# Patient Record
Sex: FEMALE | Race: WHITE | NOT HISPANIC OR LATINO | Employment: FULL TIME | URBAN - METROPOLITAN AREA
[De-identification: names, ages, dates, MRNs, and addresses within clinical notes are randomized per-mention and may not be internally consistent; named-entity substitution may affect disease eponyms.]

---

## 2023-03-29 ENCOUNTER — HOSPITAL ENCOUNTER (EMERGENCY)
Facility: HOSPITAL | Age: 53
Discharge: HOME | End: 2023-03-30
Attending: EMERGENCY MEDICINE

## 2023-03-29 DIAGNOSIS — R93.5 ABNORMAL CT OF THE ABDOMEN: ICD-10-CM

## 2023-03-29 DIAGNOSIS — I10 HYPERTENSION: ICD-10-CM

## 2023-03-29 DIAGNOSIS — D35.00 PHEOCHROMOCYTOMA: ICD-10-CM

## 2023-03-29 DIAGNOSIS — R51.9 HEADACHE: ICD-10-CM

## 2023-03-29 DIAGNOSIS — R91.1 PULMONARY NODULE: Primary | ICD-10-CM

## 2023-03-29 LAB
ALBUMIN SERPL BCP-MCNC: 4.1 G/DL (ref 3.5–5)
ALP SERPL-CCNC: 106 U/L (ref 34–104)
ALT SERPL W P-5'-P-CCNC: 17 U/L (ref 7–52)
ANION GAP SERPL CALCULATED.3IONS-SCNC: 7 MMOL/L (ref 4–13)
AST SERPL W P-5'-P-CCNC: 17 U/L (ref 13–39)
BASOPHILS # BLD MANUAL: 0.12 THOUSAND/UL (ref 0–0.1)
BASOPHILS NFR MAR MANUAL: 1 % (ref 0–1)
BILIRUB SERPL-MCNC: 0.31 MG/DL (ref 0.2–1)
BUN SERPL-MCNC: 26 MG/DL (ref 5–25)
CALCIUM SERPL-MCNC: 9.4 MG/DL (ref 8.4–10.2)
CARDIAC TROPONIN I PNL SERPL HS: 5 NG/L
CHLORIDE SERPL-SCNC: 107 MMOL/L (ref 96–108)
CO2 SERPL-SCNC: 26 MMOL/L (ref 21–32)
CREAT SERPL-MCNC: 1.23 MG/DL (ref 0.6–1.3)
EOSINOPHIL # BLD MANUAL: 0.23 THOUSAND/UL (ref 0–0.4)
EOSINOPHIL NFR BLD MANUAL: 2 % (ref 0–6)
ERYTHROCYTE [DISTWIDTH] IN BLOOD BY AUTOMATED COUNT: 16 % (ref 11.6–15.1)
GFR SERPL CREATININE-BSD FRML MDRD: 49 ML/MIN/1.73SQ M
GLUCOSE SERPL-MCNC: 105 MG/DL (ref 65–140)
HCT VFR BLD AUTO: 33.3 % (ref 34.8–46.1)
HGB BLD-MCNC: 10.2 G/DL (ref 11.5–15.4)
LYMPHOCYTES # BLD AUTO: 46 % (ref 14–44)
LYMPHOCYTES # BLD AUTO: 5.29 THOUSAND/UL (ref 0.6–4.47)
MCH RBC QN AUTO: 26.2 PG (ref 26.8–34.3)
MCHC RBC AUTO-ENTMCNC: 30.6 G/DL (ref 31.4–37.4)
MCV RBC AUTO: 85 FL (ref 82–98)
MONOCYTES # BLD AUTO: 1.04 THOUSAND/UL (ref 0–1.22)
MONOCYTES NFR BLD: 9 % (ref 4–12)
NEUTROPHILS # BLD MANUAL: 4.83 THOUSAND/UL (ref 1.85–7.62)
NEUTS SEG NFR BLD AUTO: 42 % (ref 43–75)
PLATELET # BLD AUTO: 429 THOUSANDS/UL (ref 149–390)
PLATELET BLD QL SMEAR: ABNORMAL
PMV BLD AUTO: 9 FL (ref 8.9–12.7)
POTASSIUM SERPL-SCNC: 4 MMOL/L (ref 3.5–5.3)
PROT SERPL-MCNC: 7.2 G/DL (ref 6.4–8.4)
RBC # BLD AUTO: 3.9 MILLION/UL (ref 3.81–5.12)
RBC MORPH BLD: NORMAL
SODIUM SERPL-SCNC: 140 MMOL/L (ref 135–147)
WBC # BLD AUTO: 11.5 THOUSAND/UL (ref 4.31–10.16)

## 2023-03-29 RX ORDER — GABAPENTIN 300 MG/1
600 CAPSULE ORAL ONCE
Status: COMPLETED | OUTPATIENT
Start: 2023-03-29 | End: 2023-03-30

## 2023-03-29 RX ORDER — RISPERIDONE 1 MG/1
1 TABLET ORAL ONCE
Status: COMPLETED | OUTPATIENT
Start: 2023-03-29 | End: 2023-03-30

## 2023-03-29 RX ORDER — CLONAZEPAM 0.5 MG/1
1 TABLET ORAL ONCE
Status: COMPLETED | OUTPATIENT
Start: 2023-03-29 | End: 2023-03-30

## 2023-03-29 RX ORDER — HYDROXYZINE HYDROCHLORIDE 25 MG/1
50 TABLET, FILM COATED ORAL ONCE
Status: COMPLETED | OUTPATIENT
Start: 2023-03-29 | End: 2023-03-30

## 2023-03-29 RX ORDER — ACETAMINOPHEN 325 MG/1
650 TABLET ORAL ONCE
Status: COMPLETED | OUTPATIENT
Start: 2023-03-30 | End: 2023-03-30

## 2023-03-30 ENCOUNTER — APPOINTMENT (EMERGENCY)
Dept: CT IMAGING | Facility: HOSPITAL | Age: 53
End: 2023-03-30

## 2023-03-30 VITALS
WEIGHT: 125 LBS | HEIGHT: 67 IN | DIASTOLIC BLOOD PRESSURE: 82 MMHG | BODY MASS INDEX: 19.62 KG/M2 | RESPIRATION RATE: 16 BRPM | OXYGEN SATURATION: 99 % | HEART RATE: 92 BPM | TEMPERATURE: 98 F | SYSTOLIC BLOOD PRESSURE: 148 MMHG

## 2023-03-30 LAB
2HR DELTA HS TROPONIN: 0 NG/L
ATRIAL RATE: 105 BPM
CARDIAC TROPONIN I PNL SERPL HS: 5 NG/L
P AXIS: 79 DEGREES
PR INTERVAL: 124 MS
QRS AXIS: 70 DEGREES
QRSD INTERVAL: 86 MS
QT INTERVAL: 334 MS
QTC INTERVAL: 441 MS
T WAVE AXIS: 84 DEGREES
VENTRICULAR RATE: 105 BPM

## 2023-03-30 RX ADMIN — HYDROXYZINE HYDROCHLORIDE 50 MG: 25 TABLET, FILM COATED ORAL at 00:26

## 2023-03-30 RX ADMIN — CLONAZEPAM 1 MG: 0.5 TABLET ORAL at 00:26

## 2023-03-30 RX ADMIN — IOHEXOL 100 ML: 350 INJECTION, SOLUTION INTRAVENOUS at 00:31

## 2023-03-30 RX ADMIN — GABAPENTIN 600 MG: 300 CAPSULE ORAL at 00:26

## 2023-03-30 RX ADMIN — SODIUM CHLORIDE 1000 ML: 0.9 INJECTION, SOLUTION INTRAVENOUS at 00:26

## 2023-03-30 RX ADMIN — RISPERIDONE 1 MG: 1 TABLET ORAL at 00:31

## 2023-03-30 RX ADMIN — ACETAMINOPHEN 650 MG: 325 TABLET, FILM COATED ORAL at 00:26

## 2023-03-30 NOTE — ED TRIAGE NOTES
Pt  Arrives to ED from [facility] with complaints of high blood pressure  Pt  Reports [facility] took her BP and is was 180/90 and 166/90  Pt  Reports headaches the past couple of days  Pt  Reports she was sent to ED to be checked since she has a hx of adrenal gland tumor  Denies CP or SOB   Pt  Very anxious in triage

## 2023-03-30 NOTE — ED PROVIDER NOTES
"History  Chief Complaint   Patient presents with   • Hypertension     45 yo F sent to ED from [facility] for evaluation of HTN  PMH of asthma, bipolar, CKD2, HLD, PUD, pheochromocytoma 2021  She is at [facility] for mental health, and relapse on methamphetamines  Last used 2 wks ago, when she entered [facility]  No ETOH  Denies cp/sob  No n/v  No change in bladder/bowel habits  No abd pain  No focal numbness/tingling/weakness  She used to have migraines when she was younger, but hasn't really been prone to HA until her pheochromocytoma was found in 2021  Removed at [facility]  She has routine imaging yearly to monitor, last scan in Oct  Pt hasn't been \"feeling great\" since being at [facility], had covid in the beginning of the month  Only new med is risperdal prn  No fevers/chills  BP was checked at [facility] because they couldn't get the pulse ox to work (checked routinely due to h/o asthma) so they were checking manual vitals, and incidentally found the BP to be elevated 180s SBP  Doesn't take anything for BP usually         History provided by:  Patient and medical records   used: No    Hypertension  Severity:  Moderate  Onset quality:  Unable to specify  Timing:  Unable to specify  Progression:  Improving  Chronicity:  Recurrent  Context: drug abuse, medication change and stress    Context: normal sodium, not caffeine, not herbal remedies, not noncompliance and not OTC medications used    Relieved by:  None tried  Worsened by:  Nothing  Ineffective treatments:  None tried  Associated symptoms: headaches    Associated symptoms: no abdominal pain, no chest pain, no confusion, no dizziness, no ear pain, no fatigue, no fever, no hematuria, no nausea, no neck pain, no palpitations, no shortness of breath, not vomiting and no weakness    Risk factors: kidney disease    Risk factors: no alcohol use, no cardiac disease, no cocaine use, no decongestant use, no diabetes, no family " history of hypertension, no obesity, no prior aneurysm, no prior stroke, no PVD and no tobacco use        None       Past Medical History:   Diagnosis Date   • Asthma    • Bipolar 1 disorder ([facility] [address] )    • Hyperlipemia    • Stomach ulcer        Past Surgical History:   Procedure Laterality Date   • ADRENAL GLAND SURGERY     • CHOLECYSTECTOMY     • HYSTERECTOMY         History reviewed  No pertinent family history  I have reviewed and agree with the history as documented  E-Cigarette/Vaping     E-Cigarette/Vaping Substances     Social History     Substance Use Topics   • Drug use: Not Currently     Types: Methamphetamines       Review of Systems   Constitutional: Negative for appetite change, chills, fatigue and fever  HENT: Negative for congestion, ear pain, rhinorrhea, sore throat, trouble swallowing and voice change  Eyes: Negative for photophobia, pain, redness and visual disturbance  Respiratory: Negative for cough, chest tightness and shortness of breath  Cardiovascular: Negative for chest pain, palpitations and leg swelling  Gastrointestinal: Negative for abdominal pain, blood in stool, constipation, diarrhea, nausea and vomiting  Genitourinary: Negative for difficulty urinating, dysuria, flank pain, frequency and hematuria  Musculoskeletal: Negative for back pain, neck pain and neck stiffness  Skin: Negative for rash  Neurological: Positive for headaches  Negative for dizziness, tremors, seizures, syncope, facial asymmetry, speech difficulty, weakness, light-headedness and numbness  Psychiatric/Behavioral: Negative for confusion and suicidal ideas  Physical Exam  Physical Exam  Vitals and nursing note reviewed  Constitutional:       General: She is not in acute distress  Appearance: Normal appearance  She is well-developed  She is not ill-appearing, toxic-appearing or diaphoretic  HENT:      Head: Normocephalic and atraumatic        Right Ear: External ear normal       Left Ear: External ear normal       Nose: Nose normal       Mouth/Throat:      Mouth: Mucous membranes are dry  Eyes:      General: No scleral icterus  Right eye: No discharge  Left eye: No discharge  Extraocular Movements: Extraocular movements intact  Conjunctiva/sclera: Conjunctivae normal       Pupils: Pupils are equal, round, and reactive to light  Neck:      Trachea: No tracheal deviation  Cardiovascular:      Rate and Rhythm: Normal rate and regular rhythm  Pulses: Normal pulses  Heart sounds: Normal heart sounds  No murmur heard  No friction rub  No gallop  Pulmonary:      Effort: Pulmonary effort is normal  No respiratory distress  Breath sounds: Normal breath sounds  No stridor  Chest:      Chest wall: No tenderness  Abdominal:      General: Bowel sounds are normal       Palpations: Abdomen is soft  Tenderness: There is no abdominal tenderness  There is no guarding or rebound  Musculoskeletal:         General: No deformity  Normal range of motion  Cervical back: Normal range of motion and neck supple  No rigidity or tenderness  Right lower leg: No edema  Left lower leg: No edema  Lymphadenopathy:      Cervical: No cervical adenopathy  Skin:     General: Skin is warm and dry  Coloration: Skin is not jaundiced  Findings: No bruising or rash  Neurological:      General: No focal deficit present  Mental Status: She is alert and oriented to person, place, and time  Cranial Nerves: No cranial nerve deficit  Sensory: No sensory deficit  Motor: No weakness        Coordination: Coordination normal    Psychiatric:         Behavior: Behavior normal          Vital Signs  ED Triage Vitals   Temperature Pulse Respirations Blood Pressure SpO2   03/29/23 2115 03/29/23 2115 03/29/23 2115 03/29/23 2115 03/29/23 2115   98 °F (36 7 °C) 104 16 (!) 181/83 100 %      Temp Source Heart Rate Source Patient Position - Orthostatic VS BP Location FiO2 (%)   03/29/23 2115 03/29/23 2332 03/29/23 2332 03/29/23 2332 --   Temporal Monitor Sitting Left arm       Pain Score       03/29/23 2332       5           Vitals:    03/29/23 2115 03/29/23 2332 03/30/23 0217 03/30/23 0330   BP: (!) 181/83 150/83 151/76 148/82   Pulse: 104 91 85 92   Patient Position - Orthostatic VS:  Sitting Lying Lying         Visual Acuity      ED Medications  Medications   gabapentin (NEURONTIN) capsule 600 mg (600 mg Oral Given 3/30/23 0026)   clonazePAM (KlonoPIN) tablet 1 mg (1 mg Oral Given 3/30/23 0026)   risperiDONE (RisperDAL) tablet 1 mg (1 mg Oral Given 3/30/23 0031)   hydrOXYzine HCL (ATARAX) tablet 50 mg (50 mg Oral Given 3/30/23 0026)   sodium chloride 0 9 % bolus 1,000 mL (0 mL Intravenous Stopped 3/30/23 0204)   acetaminophen (TYLENOL) tablet 650 mg (650 mg Oral Given 3/30/23 0026)   iohexol (OMNIPAQUE) 350 MG/ML injection (SINGLE-DOSE) 100 mL (100 mL Intravenous Given 3/30/23 0031)       Diagnostic Studies  Results Reviewed     Procedure Component Value Units Date/Time    Jackelin Sullivan Plasma Free [[id number]] Collected: 03/30/23 0025    Lab Status: In process Specimen: Blood from Arm, Left Updated: 03/30/23 0032    HS Troponin I 2hr [[id number]]  (Normal) Collected: 03/30/23 0002    Lab Status: Final result Specimen: Blood from Arm, Left Updated: 03/30/23 0030     hs TnI 2hr 5 ng/L      Delta 2hr hsTnI 0 ng/L     CBC and differential [[id number]]  (Abnormal) Collected: 03/29/23 2126    Lab Status: Final result Specimen: Blood from Arm, Left Updated: 03/29/23 2206     WBC 11 50 Thousand/uL      RBC 3 90 Million/uL      Hemoglobin 10 2 g/dL      Hematocrit 33 3 %      MCV 85 fL      MCH 26 2 pg      MCHC 30 6 g/dL      RDW 16 0 %      MPV 9 0 fL      Platelets 541 Thousands/uL     Narrative: This is an appended report  These results have been appended to a previously verified report      Manual Differential(PHLEBS Do Not Order) [[id number]]  (Abnormal) Collected: 03/29/23 2126    Lab Status: Final result Specimen: Blood from Arm, Left Updated: 03/29/23 2206     Segmented % 42 %      Lymphocytes % 46 %      Monocytes % 9 %      Eosinophils, % 2 %      Basophils % 1 %      Absolute Neutrophils 4 83 Thousand/uL      Lymphocytes Absolute 5 29 Thousand/uL      Monocytes Absolute 1 04 Thousand/uL      Eosinophils Absolute 0 23 Thousand/uL      Basophils Absolute 0 12 Thousand/uL      Total Counted --     RBC Morphology Normal     Platelet Estimate Increased    HS Troponin 0hr (reflex protocol) [[id number]]  (Normal) Collected: 03/29/23 2126    Lab Status: Final result Specimen: Blood from Arm, Left Updated: 03/29/23 2201     hs TnI 0hr 5 ng/L     Comprehensive metabolic panel [[id number]]  (Abnormal) Collected: 03/29/23 2126    Lab Status: Final result Specimen: Blood from Arm, Left Updated: 03/29/23 2153     Sodium 140 mmol/L      Potassium 4 0 mmol/L      Chloride 107 mmol/L      CO2 26 mmol/L      ANION GAP 7 mmol/L      BUN 26 mg/dL      Creatinine 1 23 mg/dL      Glucose 105 mg/dL      Calcium 9 4 mg/dL      AST 17 U/L      ALT 17 U/L      Alkaline Phosphatase 106 U/L      Total Protein 7 2 g/dL      Albumin 4 1 g/dL      Total Bilirubin 0 31 mg/dL      eGFR 49 ml/min/1 73sq m     Narrative:      Meli guidelines for Chronic Kidney Disease (CKD):   •  Stage 1 with normal or high GFR (GFR > 90 mL/min/1 73 square meters)  •  Stage 2 Mild CKD (GFR = 60-89 mL/min/1 73 square meters)  •  Stage 3A Moderate CKD (GFR = 45-59 mL/min/1 73 square meters)  •  Stage 3B Moderate CKD (GFR = 30-44 mL/min/1 73 square meters)  •  Stage 4 Severe CKD (GFR = 15-29 mL/min/1 73 square meters)  •  Stage 5 End Stage CKD (GFR <15 mL/min/1 73 square meters)  Note: GFR calculation is accurate only with a steady state creatinine                 CT chest abdomen pelvis w contrast   Final Result by Wai Broussard MD (03/30 0122)      1  Status post left adrenalectomy  There is soft tissue density in the adrenal bed measuring 1 1 cm  Correlate with prior outside imaging to document stability  Follow-up is recommended  2   Left lower lobe nodule measuring 3 mm  Correlate with prior outside chest imaging for stability  Alternatively, follow-up chest CT in 3 months may be considered  3   Emphysema  The study was marked in EPIC for significant notification  Workstation performed: [id number]         CT head without contrast   Final Result by Julisa Valencia MD (03/30 0150)      No acute intracranial abnormality  Workstation performed: [id number]                    Procedures  ECG 12 Lead Documentation Only    Date/Time: 3/29/2023 11:36 PM  Performed by: Tammy Holt MD  Authorized by: Tammy Holt MD     Indications / Diagnosis:  Htn  ECG reviewed by me, the ED Provider: yes    Patient location:  ED  Previous ECG:     Previous ECG:  Unavailable    Comparison to cardiac monitor: Yes    Interpretation:     Interpretation: non-specific    Quality:     Tracing quality:  Limited by artifact  Rate:     ECG rate:  105    ECG rate assessment: tachycardic    Rhythm:     Rhythm: sinus tachycardia    Ectopy:     Ectopy: none    QRS:     QRS axis:  Normal    QRS intervals:  Normal  Conduction:     Conduction: normal    ST segments:     ST segments:  Non-specific  T waves:     T waves: normal               ED Course  ED Course as of 03/30/23 0450   Thu Mar 30, 2023   0402 Still waiting to hear back from [facility] via PACS   [id number] Discussed w/ Dr Vicente Sears, endocrine at [facility]  Can d/c home  Recommends script for urine testing, wait a few days prior to starting  Can f/u outpt with [facility], he will arrange for office to call her in 2 wks or so to arrange outpt f/u  BP improved  Pt resting comfortably  Discussed RTED precautions, and f/u instructions                                               Medical Decision Making  Abnormal CT of the abdomen: acute illness or injury  Headache: acute illness or injury  Hypertension: acute illness or injury  Pheochromocytoma: acute illness or injury  Pulmonary nodule: acute illness or injury  Amount and/or Complexity of Data Reviewed  External Data Reviewed: radiology and notes  Labs: ordered  Decision-making details documented in ED Course  Radiology: ordered  Decision-making details documented in ED Course  ECG/medicine tests: ordered and independent interpretation performed  Decision-making details documented in ED Course  Risk  OTC drugs  Prescription drug management  Disposition  Final diagnoses:   Pulmonary nodule   Hypertension   Headache   Abnormal CT of the abdomen   Pheochromocytoma     Time reflects when diagnosis was documented in both MDM as applicable and the Disposition within this note     Time User Action Codes Description Comment    3/30/2023  4:45 AM Ronald Branch S Add [R91 1] Pulmonary nodule     3/30/2023  4:45 AM Ronald Branch S Add [I10] Hypertension     3/30/2023  4:45 AM Ronald Branch S Add [R51 9] Headache     3/30/2023  4:45 AM Ronald Branch S Add [R93 5] Abnormal CT of the abdomen     3/30/2023  4:47 AM Villarreal Elder Add [D35 00] Pheochromocytoma       ED Disposition     ED Disposition   Discharge    Condition   Stable    Date/Time   Thu Mar 30, 2023  4:43 AM    Comment   Rachana Reynoso discharge to home/self care  Follow-up Information     Follow up With Specialties Details Why Contact Info Additional Shadi Mcgee MD Cardiology, Internal Medicine Schedule an appointment as soon as possible for a visit  Follow up recommended, repeat CT scan 3 months   [address]  [facility] Emergency Department Emergency Medicine  If symptoms worsen [address] [place], [id number] [phone]   [facility] Emergency Department, [address] [place] [address] [address] [place] [facility] Follow up with your endocrinologist at [facility] for follow up appointment  Patient's Medications    No medications on file       Outpatient Discharge Orders   Metanephrines Fractionated, urine, 24 hour   Standing Status: Future Standing Exp   Date: 03/30/24       PDMP Review     None          ED Provider  Electronically Signed by           Andrés Albarado MD  03/30/23 1310

## 2023-04-04 LAB
METANEPH FREE SERPL-MCNC: 23.9 PG/ML (ref 0–88)
NORMETANEPHRINE SERPL-MCNC: 397.9 PG/ML (ref 0–244)

## 2023-04-06 NOTE — RESULT ENCOUNTER NOTE
Attempted to contact patient x 1 about elevated normetanephrine level  Phone number on chart not in service  Certified letter sent to address on file  Patient is supposed to f/u with doctor at [facility] for pheochromocytoma